# Patient Record
Sex: MALE | Race: WHITE
[De-identification: names, ages, dates, MRNs, and addresses within clinical notes are randomized per-mention and may not be internally consistent; named-entity substitution may affect disease eponyms.]

---

## 2018-01-24 ENCOUNTER — HOSPITAL ENCOUNTER (EMERGENCY)
Dept: HOSPITAL 62 - ER | Age: 33
Discharge: HOME | End: 2018-01-24
Payer: SELF-PAY

## 2018-01-24 VITALS — SYSTOLIC BLOOD PRESSURE: 110 MMHG | DIASTOLIC BLOOD PRESSURE: 68 MMHG
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
## 2018-01-24 DIAGNOSIS — M25.562: ICD-10-CM

## 2018-01-24 DIAGNOSIS — M25.552: Primary | ICD-10-CM

## 2018-01-24 DIAGNOSIS — F17.200: ICD-10-CM

## 2018-01-24 DIAGNOSIS — J45.909: ICD-10-CM

## 2018-01-24 PROCEDURE — 99283 EMERGENCY DEPT VISIT LOW MDM: CPT

## 2018-01-24 NOTE — RADIOLOGY REPORT (SQ)
EXAM DESCRIPTION: HIP LEFT AP/LATERAL



CLINICAL HISTORY:  fall, pain



COMPARISON: None.



FINDINGS: Single view of the pelvis and 2 views of the left hip.

No acute fracture or dislocation. The joint spaces preserved. No

abnormalities of the pelvic bones or sacrum. Normal osseous

mineralization.



IMPRESSION:

No acute fracture or dislocation.

## 2018-01-24 NOTE — ER DOCUMENT REPORT
ED Medical Screen (RME)





- General


Chief Complaint: Fall


Stated Complaint: FALL,LEG PAIN


Time Seen by Provider: 01/24/18 01:16


Notes: 





32-year-old male, chief complaint of falling on steps, landed on his left hip 

and knee, denies back pain.  States he brushed his head on the ground but does 

not have a headache and does not want his head evaluated.  No blood thinners.  

Denies any other injuries tonight.


TRAVEL OUTSIDE OF THE [place] IN LAST 30 DAYS: No





- Related Data


Allergies/Adverse Reactions: 


 





No Known Allergies Allergy (Verified 01/24/18 01:06)


 











Past Medical History





- Social History


Chew tobacco use (# tins/day): No


Frequency of alcohol use: None


Drug Abuse: None


Pulmonary Medical History: Reports: Hx Asthma


Renal/ Medical History: Denies: Hx Peritoneal Dialysis


Psychiatric Medical History: Reports: Hx Depression


Past Surgical History: Reports: Hx Abdominal Surgery - 12/2017





Physical Exam





- Vital signs


Vitals: 





 











Temp Pulse Resp BP Pulse Ox


 


 98.0 F   87   16   110/68   96 


 


 01/24/18 01:06  01/24/18 01:06  01/24/18 01:06  01/24/18 01:06  01/24/18 01:06














- Back


Back: Tender - Tender in the left lower buttock area, no midline tenderness, no 

saddle anesthesia





Course





- Vital Signs


Vital signs: 





 











Temp Pulse Resp BP Pulse Ox


 


 98.0 F   87   16   110/68   96 


 


 01/24/18 01:06  01/24/18 01:06  01/24/18 01:06  01/24/18 01:06  01/24/18 01:06

## 2018-01-24 NOTE — RADIOLOGY REPORT (SQ)
EXAM DESCRIPTION: KNEE LEFT 4 VIEW



CLINICAL HISTORY:  fall, pain



COMPARISON: None.



FINDINGS: 4 views of the left knee. No acute fracture or

dislocation. Normal osseous mineralization. No definite joint

effusion.



IMPRESSION:

No acute fracture or dislocation.
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
